# Patient Record
Sex: FEMALE | Race: WHITE | NOT HISPANIC OR LATINO | Employment: FULL TIME | URBAN - NONMETROPOLITAN AREA
[De-identification: names, ages, dates, MRNs, and addresses within clinical notes are randomized per-mention and may not be internally consistent; named-entity substitution may affect disease eponyms.]

---

## 2022-03-02 ENCOUNTER — OFFICE VISIT (OUTPATIENT)
Dept: GASTROENTEROLOGY | Facility: CLINIC | Age: 52
End: 2022-03-02

## 2022-03-02 VITALS
TEMPERATURE: 96.9 F | HEART RATE: 97 BPM | BODY MASS INDEX: 34.75 KG/M2 | DIASTOLIC BLOOD PRESSURE: 80 MMHG | OXYGEN SATURATION: 96 % | SYSTOLIC BLOOD PRESSURE: 140 MMHG | WEIGHT: 177 LBS | HEIGHT: 60 IN

## 2022-03-02 DIAGNOSIS — K22.70 BARRETT'S ESOPHAGUS WITHOUT DYSPLASIA: Primary | ICD-10-CM

## 2022-03-02 DIAGNOSIS — Z80.0 FAMILY HX OF COLON CANCER: ICD-10-CM

## 2022-03-02 DIAGNOSIS — K21.00 GASTROESOPHAGEAL REFLUX DISEASE WITH ESOPHAGITIS WITHOUT HEMORRHAGE: ICD-10-CM

## 2022-03-02 DIAGNOSIS — E11.9 CONTROLLED TYPE 2 DIABETES MELLITUS WITHOUT COMPLICATION, WITHOUT LONG-TERM CURRENT USE OF INSULIN: ICD-10-CM

## 2022-03-02 PROCEDURE — 99203 OFFICE O/P NEW LOW 30 MIN: CPT | Performed by: CLINICAL NURSE SPECIALIST

## 2022-03-02 RX ORDER — SIMVASTATIN 20 MG
20 TABLET ORAL DAILY
COMMUNITY
Start: 2022-01-03

## 2022-03-02 RX ORDER — OMEPRAZOLE 20 MG/1
20 CAPSULE, DELAYED RELEASE ORAL DAILY
Qty: 30 CAPSULE | Refills: 10 | Status: SHIPPED | OUTPATIENT
Start: 2022-03-02 | End: 2022-09-07 | Stop reason: SDUPTHER

## 2022-03-02 RX ORDER — OMEPRAZOLE 10 MG/1
20 CAPSULE, DELAYED RELEASE ORAL
COMMUNITY
End: 2022-03-02 | Stop reason: SDUPTHER

## 2022-03-02 RX ORDER — SODIUM, POTASSIUM,MAG SULFATES 17.5-3.13G
SOLUTION, RECONSTITUTED, ORAL ORAL
Qty: 177 ML | Refills: 0 | Status: SHIPPED | OUTPATIENT
Start: 2022-03-02 | End: 2022-04-21 | Stop reason: HOSPADM

## 2022-03-02 NOTE — PROGRESS NOTES
Chief Complaint   Patient presents with   • Colonoscopy     Subjective   HPI  Donna Guillermo is a 51 y.o. female who presents with hx of Barretts esophagus determined by biopsy. Course is constant.  Disease is not stable , maintains on Prilosec for the management of this. Last Endoscopy reviewed. She has no complaints of nausea or vomiting. No change in bowels. No wt loss. No BRBPR. No melena. No abdominal pain or dysphagia. She says that some days are better than others. She has burning several days per week with indigestion. TUMS and Rolaids do help.   No change in bowels. No BRBPR. She is due for colonoscopy. She has a family hx for colon cancer.   Past Medical History:   Diagnosis Date   • Anxiety    • Asthma    • Corona esophagus    • Corona's esophagus    • Diabetes mellitus (HCC)    • Family history of colon cancer    • GERD (gastroesophageal reflux disease)    • Hyperlipidemia    • Hypertension    • Vitamin D deficiency      Past Surgical History:   Procedure Laterality Date   • BREAST CYST ASPIRATION Right    • COLONOSCOPY  08/29/2014    Normal; Repeat 5 years   • ENDOSCOPY  10/23/2015    HH; Short-segment Corona's-biopsied; Repeat 3 years   • ENDOSCOPY  08/29/2014    Mild esophagitis-biopsied   • WISDOM TOOTH EXTRACTION     • WRIST SURGERY Right     X 2           Current Outpatient Medications:   •  Ipratropium-Albuterol (COMBIVENT IN), Inhale 2 puffs Every 6 (Six) Hours As Needed., Disp: , Rfl:   •  metFORMIN (GLUCOPHAGE) 1000 MG tablet, Take 1,000 mg by mouth 2 (Two) Times a Day With Meals., Disp: , Rfl:   •  omeprazole (priLOSEC) 20 MG capsule, Take 1 capsule by mouth Daily., Disp: 30 capsule, Rfl: 10  •  simvastatin (ZOCOR) 20 MG tablet, Take 20 mg by mouth Daily., Disp: , Rfl:   •  Suprep Bowel Prep Kit 17.5-3.13-1.6 GM/177ML solution oral solution, Take as directed by office instructions provided, Disp: 177 mL, Rfl: 0  No Known Allergies  Social History     Socioeconomic History   • Marital status:  Unknown   Tobacco Use   • Smoking status: Current Every Day Smoker     Types: Cigarettes     Family History   Problem Relation Age of Onset   • Colon cancer Father    • Colon cancer Maternal Aunt    • Colon cancer Maternal Uncle      Health Maintenance   Topic Date Due   • ANNUAL PHYSICAL  Never done   • TDAP/TD VACCINES (1 - Tdap) Never done   • HEPATITIS C SCREENING  Never done   • PAP SMEAR  Never done   • ZOSTER VACCINE (1 of 2) Never done   • MAMMOGRAM  Never done   • INFLUENZA VACCINE  Never done   • COVID-19 Vaccine (2 - Pfizer 3-dose series) 01/09/2022   • LIPID PANEL  Never done   • COLORECTAL CANCER SCREENING  08/29/2024   • Pneumococcal Vaccine 0-64  Aged Out     Review of Systems   Constitutional: Negative for activity change, appetite change, chills, diaphoresis, fatigue, fever and unexpected weight change.   HENT: Negative for ear pain, hearing loss, mouth sores, sore throat, trouble swallowing and voice change.    Eyes: Negative.    Respiratory: Negative for cough, choking, shortness of breath and wheezing.    Cardiovascular: Negative for chest pain and palpitations.   Gastrointestinal: Negative for abdominal pain, blood in stool, constipation, diarrhea, nausea and vomiting.   Endocrine: Negative for cold intolerance and heat intolerance.   Genitourinary: Negative for decreased urine volume, dysuria, frequency, hematuria and urgency.   Musculoskeletal: Negative for back pain, gait problem and myalgias.   Skin: Negative for color change, pallor and rash.   Allergic/Immunologic: Negative for food allergies and immunocompromised state.   Neurological: Negative for dizziness, tremors, seizures, syncope, weakness, light-headedness, numbness and headaches.   Hematological: Negative for adenopathy. Does not bruise/bleed easily.   Psychiatric/Behavioral: Negative for agitation and confusion. The patient is not nervous/anxious.    All other systems reviewed and are negative.    Objective   Vitals:    03/02/22  "1249   BP: 140/80   Pulse: 97   Temp: 96.9 °F (36.1 °C)   SpO2: 96%   Weight: 80.3 kg (177 lb)   Height: 152.4 cm (60\")     Body mass index is 34.57 kg/m².    Physical Exam  Constitutional:       Appearance: She is well-developed.   HENT:      Head: Normocephalic and atraumatic.   Eyes:      Pupils: Pupils are equal, round, and reactive to light.   Neck:      Trachea: No tracheal deviation.   Cardiovascular:      Rate and Rhythm: Normal rate and regular rhythm.      Heart sounds: Normal heart sounds. No murmur heard.  No friction rub. No gallop.    Pulmonary:      Effort: Pulmonary effort is normal. No respiratory distress.      Breath sounds: Normal breath sounds. No wheezing or rales.   Chest:      Chest wall: No tenderness.   Abdominal:      General: Bowel sounds are normal. There is no distension.      Palpations: Abdomen is soft. Abdomen is not rigid.      Tenderness: There is no abdominal tenderness. There is no guarding or rebound.   Musculoskeletal:         General: No tenderness or deformity. Normal range of motion.      Cervical back: Normal range of motion and neck supple.   Skin:     General: Skin is warm and dry.      Coloration: Skin is not pale.      Findings: No rash.   Neurological:      Mental Status: She is alert and oriented to person, place, and time.      Deep Tendon Reflexes: Reflexes are normal and symmetric.   Psychiatric:         Behavior: Behavior normal.         Thought Content: Thought content normal.         Judgment: Judgment normal.         Assessment/Plan   Diagnoses and all orders for this visit:    1. Corona's esophagus without dysplasia (Primary)  -     Case Request; Standing  -     COVID PRE-OP / PRE-PROCEDURE SCREENING ORDER (NO ISOLATION) - Swab, Nasal Cavity; Future  -     Follow Anesthesia Guidelines / Standing Orders; Future  -     Obtain Informed Consent; Future  -     Case Request    2. Gastroesophageal reflux disease with esophagitis without hemorrhage    3. Family hx of " colon cancer  -     Suprep Bowel Prep Kit 17.5-3.13-1.6 GM/177ML solution oral solution; Take as directed by office instructions provided  Dispense: 177 mL; Refill: 0    4. Controlled type 2 diabetes mellitus without complication, without long-term current use of insulin (HCC)  Comments:  Hold DM medication the day of procedure    Other orders  -     omeprazole (priLOSEC) 20 MG capsule; Take 1 capsule by mouth Daily.  Dispense: 30 capsule; Refill: 10    Add Gaviscon first to her regimen she wants to continue Prilosec, she doesn't take it everyday only as needed. Sent a prescription in for her to   I discussed non pharmaceutical treatment of gerd.  This includes gradually losing weight to achieve ideal body wt., elevation of the head of bed by 4-6 inches, nothing to eat or drink 3 hours prior to lying down, avoiding tight clothing, stress reduction, tobacco cessation, reduction of alcohol intake, and dietary restrictions (avoiding caffeine, coffee, fatty foods, mints, chocolate, spicy foods and tomato based sauces as much as possible).        ESOPHAGOGASTRODUODENOSCOPY WITH ANESTHESIA (N/A), COLONOSCOPY WITH ANESTHESIA (N/A)  Patient's Body mass index is 34.57 kg/m². indicating that she is obese (BMI >30). Obesity-related health conditions include the following: hypertension. Obesity is unchanged. BMI is is above average; BMI management plan is completed. We discussed portion control and increasing exercise..      Nara Luther, APRN  3/2/2022  13:11 CST      IF YOU SMOKE OR USE TOBACCO PLEASE READ THE FOLLOWING:   3.5 minutes provided    Why is smoking bad for me?  Smoking increases the risk of heart disease, lung disease, vascular disease, stroke, and cancer.     If you smoke, STOP!    If you would like more information on quitting smoking, please visit the Get[employer] website: [email or web address]   This link will provide additional resources including  the QUIT line and the Beat the Pack support groups.     For more information:    Quit Now [place]  1-800-QUIT-NOW  https://chipy.quitlogix.org/en-US/    Obesity, Adult  Obesity is having too much body fat. If you have a BMI of 30 or more, you are obese. BMI is a number that explains how much body fat you have. Obesity is often caused by taking in (consuming) more calories than your body uses.  Obesity can cause serious health problems. Changing your lifestyle can help to treat obesity.  Follow these instructions at home:  Eating and drinking     · Follow advice from your doctor about what to eat and drink. Your doctor may tell you to:  ¨ Cut down on (limit) fast foods, sweets, and processed snack foods.  ¨ Choose low-fat options. For example, choose low-fat milk instead of whole milk.  ¨ Eat 5 or more servings of fruits or vegetables every day.  ¨ Eat at home more often. This gives you more control over what you eat.  ¨ Choose healthy foods when you eat out.  ¨ Learn what a healthy portion size is. A portion size is the amount of a certain food that is healthy for you to eat at one time. This is different for each person.  ¨ Keep low-fat snacks available.  ¨ Avoid sugary drinks. These include soda, fruit juice, iced tea that is sweetened with sugar, and flavored milk.  ¨ Eat a healthy breakfast.  · Drink enough water to keep your pee (urine) clear or pale yellow.  · Do not go without eating for long periods of time (do not fast).  · Do not go on popular or trendy diets (fad diets).  Physical Activity   · Exercise often, as told by your doctor. Ask your doctor:  ¨ What types of exercise are safe for you.  ¨ How often you should exercise.  · Warm up and stretch before being active.  · Do slow stretching after being active (cool down).  · Rest between times of being active.  Lifestyle   · Limit how much time you spend in front of your TV, computer, or video game system (be less sedentary).  · Find ways to reward  yourself that do not involve food.  · Limit alcohol intake to no more than 1 drink a day for nonpregnant women and 2 drinks a day for men. One drink equals 12 oz of beer, 5 oz of wine, or 1½ oz of hard liquor.  General instructions   · Keep a weight loss journal. This can help you keep track of:  ¨ The food that you eat.  ¨ The exercise that you do.  · Take over-the-counter and prescription medicines only as told by your doctor.  · Take vitamins and supplements only as told by your doctor.  · Think about joining a support group. Your doctor may be able to help with this.  · Keep all follow-up visits as told by your doctor. This is important.  Contact a doctor if:  · You cannot meet your weight loss goal after you have changed your diet and lifestyle for 6 weeks.  This information is not intended to replace advice given to you by your health care provider. Make sure you discuss any questions you have with your health care provider.  Document Released: 03/11/2013 Document Revised: 05/25/2017 Document Reviewed: 10/05/2016  Elsevier Interactive Patient Education © 2017 Elsevier Inc.

## 2022-04-19 ENCOUNTER — LAB (OUTPATIENT)
Dept: LAB | Facility: HOSPITAL | Age: 52
End: 2022-04-19

## 2022-04-19 DIAGNOSIS — K22.70 BARRETT'S ESOPHAGUS WITHOUT DYSPLASIA: ICD-10-CM

## 2022-04-19 LAB — SARS-COV-2 ORF1AB RESP QL NAA+PROBE: NOT DETECTED

## 2022-04-19 PROCEDURE — U0004 COV-19 TEST NON-CDC HGH THRU: HCPCS

## 2022-04-19 PROCEDURE — C9803 HOPD COVID-19 SPEC COLLECT: HCPCS

## 2022-04-21 ENCOUNTER — HOSPITAL ENCOUNTER (OUTPATIENT)
Facility: HOSPITAL | Age: 52
Setting detail: HOSPITAL OUTPATIENT SURGERY
Discharge: HOME | End: 2022-04-21
Attending: INTERNAL MEDICINE | Admitting: INTERNAL MEDICINE

## 2022-04-21 ENCOUNTER — ANESTHESIA (OUTPATIENT)
Dept: GASTROENTEROLOGY | Facility: HOSPITAL | Age: 52
End: 2022-04-21

## 2022-04-21 ENCOUNTER — ANESTHESIA EVENT (OUTPATIENT)
Dept: GASTROENTEROLOGY | Facility: HOSPITAL | Age: 52
End: 2022-04-21

## 2022-04-21 VITALS
TEMPERATURE: 97.1 F | RESPIRATION RATE: 17 BRPM | OXYGEN SATURATION: 99 % | HEIGHT: 60 IN | BODY MASS INDEX: 34.95 KG/M2 | SYSTOLIC BLOOD PRESSURE: 125 MMHG | HEART RATE: 79 BPM | DIASTOLIC BLOOD PRESSURE: 63 MMHG | WEIGHT: 178 LBS

## 2022-04-21 DIAGNOSIS — K22.70 BARRETT'S ESOPHAGUS WITHOUT DYSPLASIA: ICD-10-CM

## 2022-04-21 LAB — GLUCOSE BLDC GLUCOMTR-MCNC: 138 MG/DL (ref 70–130)

## 2022-04-21 PROCEDURE — 45385 COLONOSCOPY W/LESION REMOVAL: CPT | Performed by: INTERNAL MEDICINE

## 2022-04-21 PROCEDURE — 43239 EGD BIOPSY SINGLE/MULTIPLE: CPT | Performed by: INTERNAL MEDICINE

## 2022-04-21 PROCEDURE — 88305 TISSUE EXAM BY PATHOLOGIST: CPT | Performed by: INTERNAL MEDICINE

## 2022-04-21 PROCEDURE — 25010000002 PROPOFOL 10 MG/ML EMULSION

## 2022-04-21 PROCEDURE — 82962 GLUCOSE BLOOD TEST: CPT

## 2022-04-21 RX ORDER — LIDOCAINE HYDROCHLORIDE 10 MG/ML
0.5 INJECTION, SOLUTION EPIDURAL; INFILTRATION; INTRACAUDAL; PERINEURAL ONCE AS NEEDED
Status: CANCELLED | OUTPATIENT
Start: 2022-04-21

## 2022-04-21 RX ORDER — SODIUM CHLORIDE 9 MG/ML
500 INJECTION, SOLUTION INTRAVENOUS CONTINUOUS PRN
Status: DISCONTINUED | OUTPATIENT
Start: 2022-04-21 | End: 2022-04-21 | Stop reason: HOSPADM

## 2022-04-21 RX ORDER — PROPOFOL 10 MG/ML
VIAL (ML) INTRAVENOUS AS NEEDED
Status: DISCONTINUED | OUTPATIENT
Start: 2022-04-21 | End: 2022-04-21 | Stop reason: SURG

## 2022-04-21 RX ORDER — HEPARIN SODIUM 1000 [USP'U]/ML
INJECTION, SOLUTION INTRAVENOUS; SUBCUTANEOUS AS NEEDED
Status: DISCONTINUED | OUTPATIENT
Start: 2022-04-21 | End: 2022-04-21

## 2022-04-21 RX ORDER — LIDOCAINE HYDROCHLORIDE 20 MG/ML
INJECTION, SOLUTION EPIDURAL; INFILTRATION; INTRACAUDAL; PERINEURAL AS NEEDED
Status: DISCONTINUED | OUTPATIENT
Start: 2022-04-21 | End: 2022-04-21 | Stop reason: SURG

## 2022-04-21 RX ORDER — SODIUM CHLORIDE 0.9 % (FLUSH) 0.9 %
10 SYRINGE (ML) INJECTION AS NEEDED
Status: DISCONTINUED | OUTPATIENT
Start: 2022-04-21 | End: 2022-04-21 | Stop reason: HOSPADM

## 2022-04-21 RX ADMIN — PROPOFOL 500 MG: 10 INJECTION, EMULSION INTRAVENOUS at 09:37

## 2022-04-21 RX ADMIN — LIDOCAINE HYDROCHLORIDE 100 MG: 20 INJECTION, SOLUTION EPIDURAL; INFILTRATION; INTRACAUDAL; PERINEURAL at 09:37

## 2022-04-21 RX ADMIN — SODIUM CHLORIDE 500 ML: 9 INJECTION, SOLUTION INTRAVENOUS at 08:21

## 2022-04-21 RX ADMIN — GLYCOPYRROLATE 0.2 MG: 0.2 INJECTION, SOLUTION INTRAMUSCULAR; INTRAVENOUS at 09:42

## 2022-04-21 RX ADMIN — LIDOCAINE HYDROCHLORIDE 60 MG: 20 INJECTION, SOLUTION EPIDURAL; INFILTRATION; INTRACAUDAL; PERINEURAL at 09:43

## 2022-04-21 NOTE — H&P
[facility]  Pre Procedure History & Physical    Chief Complaint:   Corona's, family history of colon cancer    Subjective     HPI:   Here for endoscopy and colonoscopy.  Corona's.  Also with family history of colon cancer    Past Medical History:   Past Medical History:   Diagnosis Date   • Anxiety    • Asthma    • Corona esophagus    • Corona's esophagus    • Diabetes mellitus (HCC)    • Family history of colon cancer    • GERD (gastroesophageal reflux disease)    • Hyperlipidemia    • Hypertension    • Vitamin D deficiency        Past Surgical History:  Past Surgical History:   Procedure Laterality Date   • BREAST CYST ASPIRATION Right    • COLONOSCOPY  08/29/2014    Normal; Repeat 5 years   • ENDOSCOPY  10/23/2015    HH; Short-segment Corona's-biopsied; Repeat 3 years   • ENDOSCOPY  08/29/2014    Mild esophagitis-biopsied   • WISDOM TOOTH EXTRACTION     • WRIST SURGERY Right     X 2       Family History:  Family History   Problem Relation Age of Onset   • Colon cancer Father    • Colon cancer Maternal Aunt    • Colon cancer Maternal Uncle        Social History:   reports that she has been smoking cigarettes. She has a 15.00 pack-year smoking history. She has never used smokeless tobacco. She reports current alcohol use. She reports that she does not use drugs.    Medications:   Prior to Admission medications    Medication Sig Start Date End Date Taking? Authorizing Provider   Ipratropium-Albuterol (COMBIVENT IN) Inhale 2 puffs Every 6 (Six) Hours As Needed.   Yes ProviderDanii MD   metFORMIN (GLUCOPHAGE) 1000 MG tablet Take 1,000 mg by mouth 2 (Two) Times a Day With Meals.   Yes ProviderDanii MD   omeprazole (priLOSEC) 20 MG capsule Take 1 capsule by mouth Daily. 3/2/22  Yes Nara Luther APRN   Suprep Bowel Prep Kit 17.5-3.13-1.6 GM/177ML solution oral solution Take as directed by office instructions provided 3/2/22  Yes Nara Luther APRN   simvastatin  "(ZOCOR) 20 MG tablet Take 20 mg by mouth Daily. 1/3/22   Provider, Danii, MD       Allergies:  Patient has no known allergies.    Objective     Blood pressure 147/59, pulse 87, temperature 97.1 °F (36.2 °C), temperature source Temporal, resp. rate 20, height 152.4 cm (60\"), weight 80.7 kg (178 lb), SpO2 97 %, not currently breastfeeding.    Physical Exam   Constitutional: Pt is oriented to person, place, and in no distress.   HENT: Mouth/Throat: Oropharynx is clear.   Cardiovascular: Normal rate, regular rhythm.    Pulmonary/Chest: Effort normal. No respiratory distress. No  wheezes.   Abdominal: Soft. Non-distended.  Skin: Skin is warm and dry.   Psychiatric: Mood, memory, affect and judgment appear normal.     Assessment/Plan     Diagnosis:  Corona's, family history of colon cancer    Anticipated Surgical Procedure:    Proceed with endoscopy and colonoscopy as scheduled    The following major R/B/A were discussed with the patient, however the list is not all inclusive . Risk:  Bleeding (immediate and delayed), perforation (rupture or tear), reaction to medication, missed lesion/cancer, pain during the procedure, infection, need for surgery, need for ostomy, need for mechanical ventilation (breathing machine), death.  Benefits: removal of polyp/tissue, burn/clip/or inject to stop bleeding, removal of foreign body, dilate any stricture.  Alternatives: Xray or CT, surgery, do nothing with associated risk   The patient was given time to ask question and received explanation, and agrees to proceed as per History and Physical.   No guarantee given or expressed.    EMR Dragon/transcription disclaimer: Much of this encounter note is an electronic transcription/translation of spoken language to printed text.  The electronic translation of spoken language may permit erroneous, or at times, nonsensical words or phrases to be inadvertently transcribed.  Although I have reviewed the note for such errors, some may still " exist.    Jhon Cabrera MD  09:37 CDT  4/21/2022

## 2022-04-21 NOTE — ANESTHESIA PREPROCEDURE EVALUATION
Anesthesia Evaluation     no history of anesthetic complications:  NPO Solid Status: > 8 hours             Airway   Mallampati: II  TM distance: >3 FB  Dental      Pulmonary    (+) a smoker Current, asthma,  Cardiovascular   Exercise tolerance: good (4-7 METS)    (+) hyperlipidemia,       Neuro/Psych  (-) seizures, TIA, CVA  GI/Hepatic/Renal/Endo    (+)  GERD,  diabetes mellitus,   (-) no renal disease    Musculoskeletal     Abdominal    Substance History      OB/GYN          Other                        Anesthesia Plan    ASA 2     MAC     intravenous induction     Anesthetic plan, all risks, benefits, and alternatives have been provided, discussed and informed consent has been obtained with: patient.        CODE STATUS:

## 2022-04-21 NOTE — ANESTHESIA POSTPROCEDURE EVALUATION
"Patient: Donna Guillermo    Procedure Summary     Date: 04/21/22 Room / Location:  [facility] /  [facility]    Anesthesia Start: 0934 Anesthesia Stop: 1007    Procedures:       ESOPHAGOGASTRODUODENOSCOPY WITH ANESTHESIA (N/A )      COLONOSCOPY WITH ANESTHESIA (N/A ) Diagnosis:       Corona's esophagus without dysplasia      (Corona's esophagus without dysplasia [K22.70])    Surgeons: Jhon Cabrera MD Provider: Alexandru Portillo CRNA    Anesthesia Type: MAC ASA Status: 2          Anesthesia Type: MAC    Vitals  Vitals Value Taken Time   BP     Temp     Pulse 87 04/21/22 1008   Resp     SpO2 97 % 04/21/22 1008   Vitals shown include unvalidated device data.        Post Anesthesia Care and Evaluation    Patient location during evaluation: PHASE II  Patient participation: complete - patient participated  Level of consciousness: awake  Pain score: 0  Pain management: adequate  Airway patency: patent  Anesthetic complications: No anesthetic complications  PONV Status: none  Cardiovascular status: acceptable  Respiratory status: acceptable  Hydration status: acceptable    Comments: /59 (Patient Position: Sitting)   Pulse 87   Temp 97.1 °F (36.2 °C) (Temporal)   Resp 20   Ht 152.4 cm (60\")   Wt 80.7 kg (178 lb)   SpO2 97%   BMI 34.76 kg/m²     No anesthesia care post op    "

## 2022-04-22 LAB
LAB AP CASE REPORT: NORMAL
PATH REPORT.FINAL DX SPEC: NORMAL
PATH REPORT.GROSS SPEC: NORMAL

## 2022-05-02 ENCOUNTER — TELEPHONE (OUTPATIENT)
Dept: GASTROENTEROLOGY | Facility: CLINIC | Age: 52
End: 2022-05-02

## 2022-09-07 DIAGNOSIS — K22.70 BARRETT'S ESOPHAGUS WITHOUT DYSPLASIA: Primary | ICD-10-CM

## 2022-09-07 RX ORDER — OMEPRAZOLE 20 MG/1
20 CAPSULE, DELAYED RELEASE ORAL DAILY
Qty: 90 CAPSULE | Refills: 3 | Status: SHIPPED | OUTPATIENT
Start: 2022-09-07

## 2025-06-22 ENCOUNTER — APPOINTMENT (OUTPATIENT)
Dept: GENERAL RADIOLOGY | Facility: HOSPITAL | Age: 55
End: 2025-06-22
Payer: COMMERCIAL

## 2025-06-22 ENCOUNTER — HOSPITAL ENCOUNTER (EMERGENCY)
Facility: HOSPITAL | Age: 55
Discharge: HOME | End: 2025-06-23
Admitting: EMERGENCY MEDICINE
Payer: COMMERCIAL

## 2025-06-22 DIAGNOSIS — S42.212A FX HUMERAL NECK, LEFT, CLOSED, INITIAL ENCOUNTER: Primary | ICD-10-CM

## 2025-06-22 PROCEDURE — 73030 X-RAY EXAM OF SHOULDER: CPT

## 2025-06-22 PROCEDURE — 99283 EMERGENCY DEPT VISIT LOW MDM: CPT

## 2025-06-23 ENCOUNTER — OFFICE VISIT (OUTPATIENT)
Age: 55
End: 2025-06-23
Payer: COMMERCIAL

## 2025-06-23 ENCOUNTER — TELEPHONE (OUTPATIENT)
Age: 55
End: 2025-06-23

## 2025-06-23 VITALS — WEIGHT: 167 LBS | HEIGHT: 60 IN | BODY MASS INDEX: 32.79 KG/M2

## 2025-06-23 VITALS
OXYGEN SATURATION: 97 % | HEART RATE: 82 BPM | BODY MASS INDEX: 33.18 KG/M2 | DIASTOLIC BLOOD PRESSURE: 69 MMHG | TEMPERATURE: 97.6 F | HEIGHT: 60 IN | RESPIRATION RATE: 18 BRPM | SYSTOLIC BLOOD PRESSURE: 145 MMHG | WEIGHT: 169 LBS

## 2025-06-23 DIAGNOSIS — S42.292A CLOSED 3-PART FRACTURE OF PROXIMAL END OF LEFT HUMERUS, INITIAL ENCOUNTER: Primary | ICD-10-CM

## 2025-06-23 PROCEDURE — 99204 OFFICE O/P NEW MOD 45 MIN: CPT | Performed by: ORTHOPAEDIC SURGERY

## 2025-06-23 RX ORDER — TRAMADOL HYDROCHLORIDE 50 MG/1
50 TABLET ORAL EVERY 6 HOURS PRN
COMMUNITY
Start: 2025-06-23

## 2025-06-23 RX ORDER — TRAMADOL HYDROCHLORIDE 50 MG/1
50 TABLET ORAL EVERY 6 HOURS PRN
Qty: 30 TABLET | Refills: 0 | Status: SHIPPED | OUTPATIENT
Start: 2025-06-23 | End: 2025-06-23

## 2025-06-23 RX ORDER — TRAMADOL HYDROCHLORIDE 50 MG/1
50 TABLET ORAL ONCE
Status: COMPLETED | OUTPATIENT
Start: 2025-06-23 | End: 2025-06-23

## 2025-06-23 RX ORDER — TRAMADOL HYDROCHLORIDE 50 MG/1
50 TABLET ORAL EVERY 6 HOURS PRN
Qty: 15 TABLET | Refills: 0 | Status: SHIPPED | OUTPATIENT
Start: 2025-06-23

## 2025-06-23 RX ADMIN — TRAMADOL HYDROCHLORIDE 50 MG: 50 TABLET, COATED ORAL at 00:58

## 2025-06-23 NOTE — PROGRESS NOTES
Orthopaedic Clinic Note - Established Patient    NAME:  Fallon Petersen   : 1970  MRN: [id number]      2025      CHIEF COMPLAINT: had concerns including Shoulder Pain (New Patient/Left Shoulder ).      History of Present Illness  The patient presents for evaluation of a left shoulder proximal humerus fracture.    She sustained the injury following a fall, during which her arm was positioned in an unusual manner. Despite the pain, she retains a good range of motion in her hand. She has been attempting to maintain mobility by squeezing a rubber ball and moving her wrist. She also reports discomfort in her neck, which she attributes to the sling she is currently wearing. 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    SOCIAL HISTORY  She works at a hospital.         Past Medical History:        Diagnosis Date    Asthma     well controlled.  never hospitalized due to asthma    Alves esophagus     GERD (gastroesophageal reflux disease)        Past Surgical History:        Procedure Laterality Date    ENDOSCOPY, COLON, DIAGNOSTIC      EXCISION LESION HAND / FINGER Right 2016    RT WRIST GANGLION CYST EXCISION  performed by Arian Garrido MD at [facility] OR    WISDOM TOOTH EXTRACTION         Current Medications:   Prior to Admission medications    Medication Sig Start Date End Date Taking? Authorizing Provider   traMADol (ULTRAM) 50 MG tablet Take 1 tablet by mouth every 6 hours as needed. 25  Yes ProviderBridget MD   omeprazole (PRILOSEC) 10 MG capsule Take 2 capsules by mouth daily   Yes ProviderBridget MD   albuterol-ipratropium (COMBIVENT)  MCG/ACT inhaler Inhale 2 puffs into the lungs every 6 hours as needed for Wheezing   Yes Provider, MD Bridget       Allergies:  Egg-derived products, Food color yellow, Ibuprofen, and Tylenol [acetaminophen]    System Neg/Pos Details   Constitutional negative   Fatigue and Fever.   Respiratory negative   Cough and Dyspnea.   Cardio negative

## 2025-06-23 NOTE — ED PROVIDER NOTES
Subjective   History of Present Illness  Patient states just prior to arrival she slipped and fell in the shower, and reached up with her left arm to support herself.  While she did this, she noted sudden onset popping and pain in the left shoulder, and the pain has persisted since that time.  She is unable to raise her arm overhead secondary to pain.  She notes pain primarily over the deltoid area in the proximal humerus.  Range of motion exacerbates pain.  She does not list alleviating factors beyond remaining still.  She denies surgical history of the shoulder or the arm, denies any past injuries or dislocations of the shoulder.  She stated initially she had numbness and tingling in the left upper extremity, but that is since resolved.  She declines any medication for pain at this time.        Review of Systems    History reviewed. No pertinent past medical history.    No Known Allergies    History reviewed. No pertinent surgical history.    History reviewed. No pertinent family history.    Social History     Socioeconomic History    Marital status: Significant Other           Objective   Physical Exam    Procedures           ED Course                                                       Medical Decision Making  Patient states just prior to arrival she slipped and fell in the shower, and reached up with her left arm to support herself.  While she did this, she noted sudden onset popping and pain in the left shoulder, and the pain has persisted since that time.  She is unable to raise her arm overhead secondary to pain.  She notes pain primarily over the deltoid area in the proximal humerus.  Range of motion exacerbates pain.  She does not list alleviating factors beyond remaining still.  She denies surgical history of the shoulder or the arm, denies any past injuries or dislocations of the shoulder.  She stated initially she had numbness and tingling in the left upper extremity, but that is since resolved.  She  declines any medication for pain at this time.    DDX: Humerus fracture, scapular fracture, shoulder dislocation, rotator cuff injury, shoulder contusion    Xray of the left shoulder positive for humeral neck fracture.  Spoke with Dr. Moreira at 12:17AM. States patient can be placed in a sling, and the orthopedic clinic this coming week.  I discussed radiographic findings with patient in the room, as well as treatment plan with for.  She is pleased with following up with the orthopedic provider in the coming week.  She request tramadol to be sent in for pain control, declines any stronger medication.  I discussed the patient with Dr. Davis, who is in agreement with the plan.    Problems Addressed:  Fx humeral neck, left, closed, initial encounter: complicated acute illness or injury    Amount and/or Complexity of Data Reviewed  Radiology: ordered.        Final diagnoses:   Fx humeral neck, left, closed, initial encounter       ED Disposition  ED Disposition       ED Disposition   Discharge    Condition   Stable    Comment   --               Jorge Moreira MD  [address] [place], [place] [id number] [phone]    In 3 days  For further evaluation         Medication List        New Prescriptions      traMADol 50 MG tablet  Commonly known as: ULTRAM  Take 1 tablet by mouth Every 6 (Six) Hours As Needed for Moderate Pain.               Where to Get Your Medications        These medications were sent to [facility] [address] [place] [address] - [phone] [facility] [phone] [address] [address]      Phone: [phone]   traMADol 50 MG tablet            Ochoa Adam PA-C  06/23/25 0044       Ochoa Adam PA-C  06/23/25 0055

## 2025-06-23 NOTE — DISCHARGE INSTRUCTIONS
Please call the above listed telephone number tomorrow morning to set up an appointment with the next available orthopedic physician, so that they can further evaluate your humerus fracture.  Please return to the emergency department if you have any new or worsening symptoms.  Take the prescribed medications as directed/as needed for pain.

## 2025-06-27 ENCOUNTER — TELEPHONE (OUTPATIENT)
Age: 55
End: 2025-06-27

## 2025-07-07 ENCOUNTER — OFFICE VISIT (OUTPATIENT)
Age: 55
End: 2025-07-07
Payer: COMMERCIAL

## 2025-07-07 DIAGNOSIS — M25.512 LEFT SHOULDER PAIN, UNSPECIFIED CHRONICITY: Primary | ICD-10-CM

## 2025-07-07 DIAGNOSIS — S42.292A CLOSED 3-PART FRACTURE OF PROXIMAL END OF LEFT HUMERUS, INITIAL ENCOUNTER: ICD-10-CM

## 2025-07-07 PROCEDURE — 99213 OFFICE O/P EST LOW 20 MIN: CPT | Performed by: ORTHOPAEDIC SURGERY

## 2025-07-07 NOTE — PROGRESS NOTES
Orthopaedic Clinic Note - Established Patient    NAME:  Fallon Petersen   : 1970  MRN: [id number]      2025      CHIEF COMPLAINT: had concerns including Shoulder Pain.      History of Present Illness  The patient is a 54-year-old female who is here today for follow-up for a left proximal humerus fracture.    She reports a gradual improvement in her condition, with the injury occurring two weeks ago. She has been performing pendulum exercises and has been cautious to avoid any movements that could potentially disrupt the alignment of her fracture. It was noted that she tends to flinch quickly when attempting to grasp objects. Additionally, bruising has developed on her back, near her spine and under her shoulder blade, which is not in contact with the sling. The pain has been increasing, leading to the use of roll-on lidocaine for relief.    SOCIAL HISTORY  Exercise: Pendulum exercises         Past Medical History:        Diagnosis Date    Asthma     well controlled.  never hospitalized due to asthma    Alves esophagus     GERD (gastroesophageal reflux disease)        Past Surgical History:        Procedure Laterality Date    ENDOSCOPY, COLON, DIAGNOSTIC      EXCISION LESION HAND / FINGER Right 2016    RT WRIST GANGLION CYST EXCISION  performed by Arian Garrido MD at [facility] OR    WISDOM TOOTH EXTRACTION         Current Medications:   Prior to Admission medications    Medication Sig Start Date End Date Taking? Authorizing Provider   traMADol (ULTRAM) 50 MG tablet Take 1 tablet by mouth every 6 hours as needed. 25   rBidget Carbone MD   omeprazole (PRILOSEC) 10 MG capsule Take 2 capsules by mouth daily    Bridget Carbone MD   albuterol-ipratropium (COMBIVENT)  MCG/ACT inhaler Inhale 2 puffs into the lungs every 6 hours as needed for Wheezing    Bridget Carbone MD       Allergies:  Egg-derived products, Food color yellow, Ibuprofen, and Tylenol [acetaminophen]    System

## 2025-07-21 ENCOUNTER — OFFICE VISIT (OUTPATIENT)
Age: 55
End: 2025-07-21
Payer: COMMERCIAL

## 2025-07-21 VITALS — HEIGHT: 60 IN | BODY MASS INDEX: 32.79 KG/M2 | WEIGHT: 167 LBS

## 2025-07-21 DIAGNOSIS — S42.292A CLOSED 3-PART FRACTURE OF PROXIMAL END OF LEFT HUMERUS, INITIAL ENCOUNTER: ICD-10-CM

## 2025-07-21 DIAGNOSIS — M25.512 LEFT SHOULDER PAIN, UNSPECIFIED CHRONICITY: Primary | ICD-10-CM

## 2025-07-21 PROCEDURE — 99213 OFFICE O/P EST LOW 20 MIN: CPT | Performed by: ORTHOPAEDIC SURGERY

## 2025-07-21 NOTE — PROGRESS NOTES
Orthopaedic Clinic Note - Established Patient    NAME:  Fallon Petersen   : 1970  MRN: [id number]      2025      CHIEF COMPLAINT: Follow-up left proximal humerus fracture.    History of Present Illness  The patient presents for evaluation of a left proximal humerus fracture    She reports significant improvement in his condition, with a notable decrease in pain.  She has not yet started formal physical therapy but has been engaging in passive motion exercises, including shoulder rolls and arm stretches, as demonstrated by a YouTube video.  She is able to touch his face and has been making efforts to keep his muscles stimulated.  She has been using a sling for support, which he removes during sleep.  She has not attempted to lie flat on the bed due to discomfort.    His work excuse extends until the first week of 2025.    SOCIAL HISTORY  Exercise: Passive motion exercises, shoulder rolls, arm and hand stretching.         Past Medical History:        Diagnosis Date    Asthma     well controlled.  never hospitalized due to asthma    Alves esophagus     GERD (gastroesophageal reflux disease)        Past Surgical History:        Procedure Laterality Date    ENDOSCOPY, COLON, DIAGNOSTIC      EXCISION LESION HAND / FINGER Right 2016    RT WRIST GANGLION CYST EXCISION  performed by Arian Garrido MD at [facility] OR    WISDOM TOOTH EXTRACTION         Current Medications:   Prior to Admission medications    Medication Sig Start Date End Date Taking? Authorizing Provider   traMADol (ULTRAM) 50 MG tablet Take 1 tablet by mouth every 6 hours as needed. 25  Yes Bridget Carbone MD   omeprazole (PRILOSEC) 10 MG capsule Take 2 capsules by mouth daily   Yes Bridget Carbone MD   albuterol-ipratropium (COMBIVENT)  MCG/ACT inhaler Inhale 2 puffs into the lungs every 6 hours as needed for Wheezing   Yes Bridget Carbone MD       Allergies:  Egg-derived products, Food color yellow, Ibuprofen,

## 2025-07-30 ENCOUNTER — TELEPHONE (OUTPATIENT)
Age: 55
End: 2025-07-30

## 2025-07-30 NOTE — TELEPHONE ENCOUNTER
Patient feels she is unable to perform work duties this soon.  Asking for extension on her off work.  Note sent to my chart.

## (undated) DEVICE — THE SINGLE USE ETRAP – POLYP TRAP IS USED FOR SUCTION RETRIEVAL OF ENDOSCOPICALLY REMOVED POLYPS.: Brand: ETRAP

## (undated) DEVICE — SNAR POLYP CAPTIVATOR RND STFF 2.4 240CM 10MM 1P/U

## (undated) DEVICE — SENSR O2 OXIMAX FNGR A/ 18IN NONSTR

## (undated) DEVICE — MASK,OXYGEN,MED CONC,ADLT,7' TUB, UC: Brand: PENDING

## (undated) DEVICE — YANKAUER,BULB TIP WITH VENT: Brand: ARGYLE

## (undated) DEVICE — FRCP BIOP ENDO CAPTURAPRO SPK SERR 2.8MM 230CM

## (undated) DEVICE — THE CHANNEL CLEANING BRUSH IS A NYLON FLEXI BRUSH ATTACHED TO A FLEXIBLE PLASTIC SHEATH DESIGNED TO SAFELY REMOVE DEBRIS FROM FLEXIBLE ENDOSCOPES.

## (undated) DEVICE — CUFF,BP,DISP,1 TUBE,ADULT,HP: Brand: MEDLINE

## (undated) DEVICE — Device: Brand: DEFENDO AIR/WATER/SUCTION AND BIOPSY VALVE

## (undated) DEVICE — CONMED SCOPE SAVER BITE BLOCK, 20X27 MM: Brand: SCOPE SAVER

## (undated) DEVICE — TBG SMPL FLTR LINE NASL 02/C02 A/ BX/100